# Patient Record
Sex: FEMALE | Race: WHITE
[De-identification: names, ages, dates, MRNs, and addresses within clinical notes are randomized per-mention and may not be internally consistent; named-entity substitution may affect disease eponyms.]

---

## 2020-08-31 ENCOUNTER — HOSPITAL ENCOUNTER (OUTPATIENT)
Dept: HOSPITAL 62 - WI | Age: 70
End: 2020-08-31
Payer: MEDICARE

## 2020-08-31 DIAGNOSIS — M47.26: ICD-10-CM

## 2020-08-31 DIAGNOSIS — M54.5: ICD-10-CM

## 2020-08-31 DIAGNOSIS — M85.88: ICD-10-CM

## 2020-08-31 DIAGNOSIS — M54.2: Primary | ICD-10-CM

## 2020-08-31 DIAGNOSIS — Z78.0: ICD-10-CM

## 2020-08-31 PROCEDURE — 72141 MRI NECK SPINE W/O DYE: CPT

## 2020-08-31 PROCEDURE — 77080 DXA BONE DENSITY AXIAL: CPT

## 2020-08-31 PROCEDURE — 72148 MRI LUMBAR SPINE W/O DYE: CPT

## 2020-08-31 NOTE — RADIOLOGY REPORT (SQ)
EXAM DESCRIPTION:  MRI CERVICAL SPINE WITHOUT



IMAGES COMPLETED DATE/TIME:  8/31/2020 10:00 am



REASON FOR STUDY:  M54.2 CERVICALGIA M54.2  CERVICALGIA M54.5  LOW BACK PAIN M47.26  OTHER SPONDYLOSI
S WITH RADICULOPATHY, LUMBAR REGION



COMPARISON:  None.



TECHNIQUE:  Sagittal and Axial imaging includes T1, T2, STIR and gradient echo sequences.



LIMITATIONS:  None.



FINDINGS:  ALIGNMENT: Straightening.  No rosemarie subluxation.

VERTEBRAE: Intact.

BONE MARROW: Endplate reactive edema at the C5-6 and C6-7 degenerated disc levels.

DISCS: Variable signal and height loss.  Most pronounced at C5-6 and C6-7.

HARDWARE: None in the spine.

CORD AND BASE OF BRAIN: Normal in size and signal intensity.

SOFT TISSUES: No soft tissue masses.

C1-C2: No significant spinal stenosis.

C2-C3: Right uncovertebral spurring with mild -moderate right foraminal stenosis.

C3-C4: Facet arthropathy.  No suggestion of stenosis.

C4-C5: Facet arthropathy.  No significant stenosis.

C5-C6: Disc osteophyte complex.  Cord contact without flattening.  Mild left and moderate -marked rig
ht foraminal stenosis.

C6-C7: Disc osteophyte complex without cord compression.  At least moderate right foraminal stenosis.


C7-T1: No significant spinal stenosis or exit foraminal stenosis.

UPPER THORACIC: Incompletely imaged. No significant spinal stenosis or exit foraminal stenosis.

OTHER: No other significant finding.



IMPRESSION:  Cervical spondylosis without high-grade central stenosis or rosemarie cord compression.



TECHNICAL DOCUMENTATION:  JOB ID:  [id number]

 2011 [employer]- All Rights Reserved



Reading location - IP/workstation name: [email or web address]

## 2020-08-31 NOTE — WOMENS IMAGING REPORT
EXAM DESCRIPTION:  BONE DENSITY HIP/SPINE



IMAGES COMPLETED DATE/TIME:  8/31/2020 9:39 am



REASON FOR STUDY:  Z78.0 ASYMPTOMATIC MENOPAUSAL STATE M54.2  CERVICALGIA M54.5  LOW BACK PAIN M47.26
  OTHER SPONDYLOSIS WITH RADICULOPATHY, LUMBAR REGION



COMPARISON:   Same day MRI



TECHNIQUE:  Dual-Energy X-ray Absorptiometry (DEXA) of the AP Spine and Hip.



LIMITATIONS:  None.



FINDINGS:  LUMBAR SPINE:

The bone mineral density (BMD) measured from L1-L4 in the AP projection correlates with a T-score of 
-0.7, which is normal as defined by the World Health Organization.

BMD Change vs Baseline:  N/A

HIP:

The bone mineral density (BMD) measured in the left hip correlates with a T-score of -1.3, which is o
steopenia as defined by the World Health Organization.

BMD Change vs Baseline:  N/A

10 year Fracture Risk Assessment:

Major Osteoporotic Fracture:  9.4%

Hip Fracture:  1.9%



IMPRESSION:  1. LUMBAR SPINE WHO CLASSIFICATION: NORMAL.

2. HIP WHO CLASSIFICATION: OSTEOPENIA.

OVERALL ASSESSMENT:

WHO CLASSIFICATION:  OSTEOPENIA.



COMMENT:  The World Health Organization defines low BMD as follows:

T-score:

Normal: At or above -1.0

Osteopenia: Between -1.0 and -2.5

Osteoporosis: At or below -2.5 without fractures

Established osteoporosis: At or below -2.5 with fractures

In general, you may wish to consider:

Diagnosis          Treatment                     Follow-up DEXA

Normal BMD      Prevention                    2-3 years

Osteopenia       Prevention/Therapy        1-2 years

Osteoporosis     Therapy                        Yearly



TECHNICAL DOCUMENTATION:  JOB ID:  [id number]

 2011 [employer]- All Rights Reserved



Reading location - IP/workstation name: [email or web address]

## 2020-08-31 NOTE — RADIOLOGY REPORT (SQ)
EXAM DESCRIPTION:  MRI LUMBAR SPINE WITHOUT



IMAGES COMPLETED DATE/TIME:  8/31/2020 10:00 am



REASON FOR STUDY:  M54.5 LOW BACK PAIN, M47.26 OTHER SPONDYLOSIS WITH RADICULOPATHY, LUMBAR RE M54.2 
 CERVICALGIA M54.5  LOW BACK PAIN M47.26  OTHER SPONDYLOSIS WITH RADICULOPATHY, LUMBAR REGION



COMPARISON:  None.



TECHNIQUE:  Sagittal and Axial imaging includes T1, T2, STIR and gradient echo sequences. Coronal T2/
HASTE imaging.



LIMITATIONS:  None.



FINDINGS:  VISUALIZED UPPER ABDOMEN:  Limited evaluation. No acute or suspicious findings suggested.

SEGMENTATION: No transitional anatomy. The lowest well-developed disc space is labeled L5-S1.

ALIGNMENT: Anatomic.

VERTEBRAE: Intact.

BONE MARROW: Normal. No marrow replacement or reactive changes.

DISC SIGNAL: Decreased T2 signal intensity.  Narrowing of the L4-5 disc space.

POSTERIOR ELEMENTS:  Generally intact.  No pars defect evident.

HARDWARE: None in the spine.

CORD AND CONUS: Normal in size and signal intensity. Conus at the L1-2 level.

SOFT TISSUES: No aortic aneurysm seen. No bulky retroperitoneal adenopathy or mass. No paraspinal mas
s or fluid.

L1-L2: No significant spinal stenosis or exit foraminal stenosis.

L2-L3: Mild concentric disc bulging with no central canal or foraminal stenosis.

L3-L4: No significant spinal stenosis or exit foraminal stenosis.

L4-L5: Asymmetrical disc bulge, predominantly in the midline but also resulting in mild right foramin
al stenosis.

L5-S1: Left paracentral and foraminal disc bulge slightly displaces the traversing nerve root and res
ults in left foraminal stenosis.

LOWER THORACIC: Incompletely imaged.  No stenosis seen.

SACRUM: Visualized upper sacrum intact.

OTHER: No other significant findings.



IMPRESSION:  Disc bulges as described.  Most significant finding is at L5-S1 with areas displacement 
of the left traversing nerve root and left foraminal stenosis.  There is mild right foraminal stenosi
s at L4-5.  Findings as described.



TECHNICAL DOCUMENTATION:  JOB ID:  [id number]

 [employer]- All Rights Reserved



Reading location - IP/workstation name: [email or web address]